# Patient Record
Sex: MALE | Race: WHITE | NOT HISPANIC OR LATINO | URBAN - METROPOLITAN AREA
[De-identification: names, ages, dates, MRNs, and addresses within clinical notes are randomized per-mention and may not be internally consistent; named-entity substitution may affect disease eponyms.]

---

## 2017-03-22 ENCOUNTER — OUTPATIENT (OUTPATIENT)
Dept: OUTPATIENT SERVICES | Facility: HOSPITAL | Age: 67
LOS: 1 days | End: 2017-03-22

## 2017-06-09 ENCOUNTER — OUTPATIENT (OUTPATIENT)
Dept: OUTPATIENT SERVICES | Facility: HOSPITAL | Age: 67
LOS: 1 days | End: 2017-06-09

## 2017-11-14 ENCOUNTER — OUTPATIENT (OUTPATIENT)
Dept: OUTPATIENT SERVICES | Facility: HOSPITAL | Age: 67
LOS: 1 days | End: 2017-11-14

## 2017-12-23 ENCOUNTER — EMERGENCY (EMERGENCY)
Facility: HOSPITAL | Age: 67
LOS: 1 days | End: 2017-12-23
Payer: MEDICARE

## 2017-12-23 PROCEDURE — 99285 EMERGENCY DEPT VISIT HI MDM: CPT

## 2017-12-23 PROCEDURE — 71010: CPT | Mod: 26

## 2017-12-23 PROCEDURE — 70450 CT HEAD/BRAIN W/O DYE: CPT | Mod: 26

## 2018-01-17 ENCOUNTER — OUTPATIENT (OUTPATIENT)
Dept: OUTPATIENT SERVICES | Facility: HOSPITAL | Age: 68
LOS: 1 days | End: 2018-01-17

## 2018-01-23 ENCOUNTER — OUTPATIENT (OUTPATIENT)
Dept: OUTPATIENT SERVICES | Facility: HOSPITAL | Age: 68
LOS: 1 days | End: 2018-01-23

## 2018-03-15 ENCOUNTER — OUTPATIENT (OUTPATIENT)
Dept: OUTPATIENT SERVICES | Facility: HOSPITAL | Age: 68
LOS: 1 days | End: 2018-03-15
Payer: MEDICARE

## 2018-03-15 PROCEDURE — 72110 X-RAY EXAM L-2 SPINE 4/>VWS: CPT | Mod: 26

## 2018-05-14 ENCOUNTER — OUTPATIENT (OUTPATIENT)
Dept: OUTPATIENT SERVICES | Facility: HOSPITAL | Age: 68
LOS: 1 days | End: 2018-05-14

## 2018-07-30 ENCOUNTER — OUTPATIENT (OUTPATIENT)
Dept: OUTPATIENT SERVICES | Facility: HOSPITAL | Age: 68
LOS: 1 days | End: 2018-07-30

## 2018-11-02 ENCOUNTER — OUTPATIENT (OUTPATIENT)
Dept: OUTPATIENT SERVICES | Facility: HOSPITAL | Age: 68
LOS: 1 days | End: 2018-11-02

## 2019-02-05 ENCOUNTER — OUTPATIENT (OUTPATIENT)
Dept: OUTPATIENT SERVICES | Facility: HOSPITAL | Age: 69
LOS: 1 days | End: 2019-02-05

## 2019-03-05 ENCOUNTER — OUTPATIENT (OUTPATIENT)
Dept: OUTPATIENT SERVICES | Facility: HOSPITAL | Age: 69
LOS: 1 days | End: 2019-03-05

## 2019-04-02 ENCOUNTER — OUTPATIENT (OUTPATIENT)
Dept: OUTPATIENT SERVICES | Facility: HOSPITAL | Age: 69
LOS: 1 days | End: 2019-04-02

## 2019-05-07 ENCOUNTER — OUTPATIENT (OUTPATIENT)
Dept: OUTPATIENT SERVICES | Facility: HOSPITAL | Age: 69
LOS: 1 days | End: 2019-05-07

## 2019-05-28 ENCOUNTER — TRANSCRIPTION ENCOUNTER (OUTPATIENT)
Age: 69
End: 2019-05-28

## 2019-06-20 ENCOUNTER — OUTPATIENT (OUTPATIENT)
Dept: OUTPATIENT SERVICES | Facility: HOSPITAL | Age: 69
LOS: 1 days | End: 2019-06-20

## 2019-09-05 ENCOUNTER — OUTPATIENT (OUTPATIENT)
Dept: OUTPATIENT SERVICES | Facility: HOSPITAL | Age: 69
LOS: 1 days | End: 2019-09-05

## 2019-12-12 ENCOUNTER — OUTPATIENT (OUTPATIENT)
Dept: OUTPATIENT SERVICES | Facility: HOSPITAL | Age: 69
LOS: 1 days | End: 2019-12-12

## 2020-01-16 ENCOUNTER — OUTPATIENT (OUTPATIENT)
Dept: OUTPATIENT SERVICES | Facility: HOSPITAL | Age: 70
LOS: 1 days | End: 2020-01-16

## 2020-02-14 ENCOUNTER — OUTPATIENT (OUTPATIENT)
Dept: OUTPATIENT SERVICES | Facility: HOSPITAL | Age: 70
LOS: 1 days | End: 2020-02-14

## 2020-03-07 ENCOUNTER — OUTPATIENT (OUTPATIENT)
Dept: OUTPATIENT SERVICES | Facility: HOSPITAL | Age: 70
LOS: 1 days | End: 2020-03-07

## 2020-05-22 ENCOUNTER — OUTPATIENT (OUTPATIENT)
Dept: OUTPATIENT SERVICES | Facility: HOSPITAL | Age: 70
LOS: 1 days | End: 2020-05-22

## 2020-09-10 ENCOUNTER — OUTPATIENT (OUTPATIENT)
Dept: OUTPATIENT SERVICES | Facility: HOSPITAL | Age: 70
LOS: 1 days | End: 2020-09-10

## 2020-09-15 ENCOUNTER — OUTPATIENT (OUTPATIENT)
Dept: OUTPATIENT SERVICES | Facility: HOSPITAL | Age: 70
LOS: 1 days | End: 2020-09-15

## 2020-09-19 ENCOUNTER — OUTPATIENT (OUTPATIENT)
Dept: OUTPATIENT SERVICES | Facility: HOSPITAL | Age: 70
LOS: 1 days | End: 2020-09-19

## 2020-10-05 ENCOUNTER — OUTPATIENT (OUTPATIENT)
Dept: OUTPATIENT SERVICES | Facility: HOSPITAL | Age: 70
LOS: 1 days | End: 2020-10-05

## 2021-01-13 ENCOUNTER — OUTPATIENT (OUTPATIENT)
Dept: OUTPATIENT SERVICES | Facility: HOSPITAL | Age: 71
LOS: 1 days | End: 2021-01-13

## 2021-01-21 ENCOUNTER — OUTPATIENT (OUTPATIENT)
Dept: OUTPATIENT SERVICES | Facility: HOSPITAL | Age: 71
LOS: 1 days | End: 2021-01-21

## 2021-01-21 PROBLEM — Z00.00 ENCOUNTER FOR PREVENTIVE HEALTH EXAMINATION: Status: ACTIVE | Noted: 2021-01-21

## 2021-01-22 ENCOUNTER — OUTPATIENT (OUTPATIENT)
Dept: OUTPATIENT SERVICES | Facility: HOSPITAL | Age: 71
LOS: 1 days | End: 2021-01-22

## 2021-02-09 ENCOUNTER — APPOINTMENT (OUTPATIENT)
Dept: MAMMOGRAPHY | Facility: CLINIC | Age: 71
End: 2021-02-09
Payer: MEDICARE

## 2021-02-09 ENCOUNTER — APPOINTMENT (OUTPATIENT)
Dept: ULTRASOUND IMAGING | Facility: CLINIC | Age: 71
End: 2021-02-09
Payer: MEDICARE

## 2021-02-09 PROCEDURE — G0279: CPT

## 2021-02-09 PROCEDURE — 77066 DX MAMMO INCL CAD BI: CPT

## 2021-02-09 PROCEDURE — 76641 ULTRASOUND BREAST COMPLETE: CPT | Mod: RT

## 2021-02-19 ENCOUNTER — APPOINTMENT (OUTPATIENT)
Dept: BREAST CENTER | Facility: CLINIC | Age: 71
End: 2021-02-19

## 2021-03-12 ENCOUNTER — OUTPATIENT (OUTPATIENT)
Dept: OUTPATIENT SERVICES | Facility: HOSPITAL | Age: 71
LOS: 1 days | End: 2021-03-12

## 2021-05-10 ENCOUNTER — OUTPATIENT (OUTPATIENT)
Dept: OUTPATIENT SERVICES | Facility: HOSPITAL | Age: 71
LOS: 1 days | End: 2021-05-10

## 2021-09-24 ENCOUNTER — OUTPATIENT (OUTPATIENT)
Dept: OUTPATIENT SERVICES | Facility: HOSPITAL | Age: 71
LOS: 1 days | End: 2021-09-24

## 2021-10-01 ENCOUNTER — OUTPATIENT (OUTPATIENT)
Dept: OUTPATIENT SERVICES | Facility: HOSPITAL | Age: 71
LOS: 1 days | End: 2021-10-01

## 2021-11-02 ENCOUNTER — OUTPATIENT (OUTPATIENT)
Dept: OUTPATIENT SERVICES | Facility: HOSPITAL | Age: 71
LOS: 1 days | End: 2021-11-02

## 2022-01-27 ENCOUNTER — OUTPATIENT (OUTPATIENT)
Dept: OUTPATIENT SERVICES | Facility: HOSPITAL | Age: 72
LOS: 1 days | End: 2022-01-27

## 2022-01-27 DIAGNOSIS — R05.9 COUGH, UNSPECIFIED: ICD-10-CM

## 2022-02-07 ENCOUNTER — OUTPATIENT (OUTPATIENT)
Dept: OUTPATIENT SERVICES | Facility: HOSPITAL | Age: 72
LOS: 1 days | End: 2022-02-07

## 2022-02-07 DIAGNOSIS — C61 MALIGNANT NEOPLASM OF PROSTATE: ICD-10-CM

## 2022-02-07 DIAGNOSIS — I10 ESSENTIAL (PRIMARY) HYPERTENSION: ICD-10-CM

## 2022-02-07 DIAGNOSIS — R73.02 IMPAIRED GLUCOSE TOLERANCE (ORAL): ICD-10-CM

## 2022-02-07 DIAGNOSIS — E78.2 MIXED HYPERLIPIDEMIA: ICD-10-CM

## 2022-02-07 DIAGNOSIS — M1A.9XX0 CHRONIC GOUT, UNSPECIFIED, WITHOUT TOPHUS (TOPHI): ICD-10-CM

## 2022-02-10 ENCOUNTER — OUTPATIENT (OUTPATIENT)
Dept: OUTPATIENT SERVICES | Facility: HOSPITAL | Age: 72
LOS: 1 days | End: 2022-02-10

## 2022-02-10 DIAGNOSIS — C61 MALIGNANT NEOPLASM OF PROSTATE: ICD-10-CM

## 2022-06-06 ENCOUNTER — OUTPATIENT (OUTPATIENT)
Dept: OUTPATIENT SERVICES | Facility: HOSPITAL | Age: 72
LOS: 1 days | End: 2022-06-06

## 2022-06-06 DIAGNOSIS — I25.10 ATHEROSCLEROTIC HEART DISEASE OF NATIVE CORONARY ARTERY WITHOUT ANGINA PECTORIS: ICD-10-CM

## 2022-06-06 DIAGNOSIS — E78.00 PURE HYPERCHOLESTEROLEMIA, UNSPECIFIED: ICD-10-CM

## 2022-06-06 DIAGNOSIS — G47.33 OBSTRUCTIVE SLEEP APNEA (ADULT) (PEDIATRIC): ICD-10-CM

## 2022-06-06 DIAGNOSIS — I10 ESSENTIAL (PRIMARY) HYPERTENSION: ICD-10-CM

## 2022-06-06 DIAGNOSIS — R73.01 IMPAIRED FASTING GLUCOSE: ICD-10-CM

## 2022-08-24 ENCOUNTER — OUTPATIENT (OUTPATIENT)
Dept: OUTPATIENT SERVICES | Facility: HOSPITAL | Age: 72
LOS: 1 days | End: 2022-08-24

## 2022-08-24 DIAGNOSIS — C61 MALIGNANT NEOPLASM OF PROSTATE: ICD-10-CM

## 2023-05-03 ENCOUNTER — NON-APPOINTMENT (OUTPATIENT)
Age: 73
End: 2023-05-03

## 2023-05-09 ENCOUNTER — NON-APPOINTMENT (OUTPATIENT)
Age: 73
End: 2023-05-09

## 2023-05-09 VITALS — BODY MASS INDEX: 31.1 KG/M2 | WEIGHT: 210 LBS | HEIGHT: 69 IN

## 2023-05-09 DIAGNOSIS — Z87.891 PERSONAL HISTORY OF NICOTINE DEPENDENCE: ICD-10-CM

## 2023-05-09 NOTE — HISTORY OF PRESENT ILLNESS
[Former] : Former [TextBox_13] : Referred by Dr. Patience Ruiz.\par \par Mr. GAXIOLA is a 72 year old male with a history of CAD s/p stent, HTN, HLD and prostate cancer.\par \par He was called to review eligibility for Low-Dose CT lung cancer screening.  Reviewed and confirmed that the patient meets screening eligibility criteria:\par \par 72 years old \par \par Smoking Status: Former smoker  \par \par Number of pack(s) per day: 1\par Number of years smoked: 50\par Number of pack years smokin\par \par Number of years since quitting smokin\par Quit year: \par \par No symptoms of lung cancer, including new cough, change in cough, hemoptysis, and unintentional weight loss.\par \par No personal history of lung cancer.  No lung cancer in a first degree relative.  No history of lung disease or occupational exposures. [YearQuit] : 2016 [PacksperDay] : 1 [N_Years] : 50 [PacksperYear] : 50

## 2023-05-15 ENCOUNTER — APPOINTMENT (OUTPATIENT)
Dept: CT IMAGING | Facility: CLINIC | Age: 73
End: 2023-05-15
Payer: MEDICARE

## 2023-05-15 PROCEDURE — 71271 CT THORAX LUNG CANCER SCR C-: CPT

## 2023-05-25 ENCOUNTER — TRANSCRIPTION ENCOUNTER (OUTPATIENT)
Age: 73
End: 2023-05-25

## 2023-10-11 ENCOUNTER — OFFICE (OUTPATIENT)
Dept: URBAN - METROPOLITAN AREA CLINIC 38 | Facility: CLINIC | Age: 73
Setting detail: OPHTHALMOLOGY
End: 2023-10-11
Payer: MEDICARE

## 2023-10-11 DIAGNOSIS — H01.001: ICD-10-CM

## 2023-10-11 DIAGNOSIS — H01.004: ICD-10-CM

## 2023-10-11 DIAGNOSIS — H01.002: ICD-10-CM

## 2023-10-11 DIAGNOSIS — H25.13: ICD-10-CM

## 2023-10-11 DIAGNOSIS — H01.005: ICD-10-CM

## 2023-10-11 DIAGNOSIS — H43.811: ICD-10-CM

## 2023-10-11 PROCEDURE — 92004 COMPRE OPH EXAM NEW PT 1/>: CPT

## 2023-10-11 ASSESSMENT — LID EXAM ASSESSMENTS
OD_BLEPHARITIS: RLL RUL 1+ 2+
OS_BLEPHARITIS: LLL LUL 1+ 2+

## 2023-10-11 ASSESSMENT — CONFRONTATIONAL VISUAL FIELD TEST (CVF)
OS_FINDINGS: FULL
OD_FINDINGS: FULL

## 2023-10-11 ASSESSMENT — TONOMETRY
OS_IOP_MMHG: 12
OD_IOP_MMHG: 10

## 2023-10-13 ASSESSMENT — REFRACTION_CURRENTRX
OS_OVR_VA: 20/
OS_CYLINDER: -0.50
OS_VPRISM_DIRECTION: SV
OD_OVR_VA: 20/
OD_SPHERE: +2.25
OD_SPHERE: +2.00
OD_VPRISM_DIRECTION: SV
OD_OVR_VA: 20/
OS_VPRISM_DIRECTION: SV
OD_VPRISM_DIRECTION: SV
OS_OVR_VA: 20/
OS_AXIS: 175
OS_SPHERE: +2.00
OS_SPHERE: +2.25
OD_CYLINDER: SPH

## 2023-10-13 ASSESSMENT — KERATOMETRY
OS_AXISANGLE_DEGREES: 084
OD_K2POWER_DIOPTERS: 44.00
OD_AXISANGLE_DEGREES: 070
OS_K1POWER_DIOPTERS: 41.25
METHOD_AUTO_MANUAL: AUTO
OS_K2POWER_DIOPTERS: 42.50
OD_K1POWER_DIOPTERS: 42.50

## 2023-10-13 ASSESSMENT — SPHEQUIV_DERIVED
OS_SPHEQUIV: -0.125
OS_SPHEQUIV: -0.125
OD_SPHEQUIV: 0.375
OD_SPHEQUIV: 0.875
OS_SPHEQUIV: 0.125
OD_SPHEQUIV: 0.875

## 2023-10-13 ASSESSMENT — REFRACTION_MANIFEST
OS_AXIS: 010
OS_ADD: +2.75
OD_CYLINDER: -0.75
OU_VA: 20/25
OS_SPHERE: +0.25
OD_AXIS: 115
OS_VA2: 20/20(J1+)
OD_AXIS: 115
OD_VA1: 20/20-2
OD_SPHERE: +1.25
OD_SPHERE: +1.25
OS_VA1: 20/20-2
OS_VA1: 20/20-2
OS_CYLINDER: -0.75
OD_ADD: +2.75
OD_VA2: 20/20(J1+)
OS_VA2: 20/20(J1+)
OU_VA: 20/25
OD_VA1: 20/20-2
OD_VA2: 20/20(J1+)
OD_ADD: +2.75
OD_CYLINDER: -0.75
OS_ADD: +2.75
OS_SPHERE: +0.25
OS_AXIS: 010
OS_CYLINDER: -0.75

## 2023-10-13 ASSESSMENT — REFRACTION_AUTOREFRACTION
OS_CYLINDER: -1.25
OD_CYLINDER: -1.25
OS_SPHERE: +0.75
OS_AXIS: 011
OD_SPHERE: +1.00
OD_AXIS: 114

## 2023-10-13 ASSESSMENT — AXIALLENGTH_DERIVED
OS_AL: 24.2556
OD_AL: 23.3454
OS_AL: 24.1531
OS_AL: 24.2556
OD_AL: 23.3454
OD_AL: 23.5378

## 2023-10-13 ASSESSMENT — VISUAL ACUITY
OD_BCVA: 20/30
OS_BCVA: 20/20-2

## 2024-07-25 ENCOUNTER — NON-APPOINTMENT (OUTPATIENT)
Age: 74
End: 2024-07-25

## 2024-07-25 VITALS — WEIGHT: 210 LBS | BODY MASS INDEX: 31.1 KG/M2 | HEIGHT: 69 IN

## 2024-07-25 DIAGNOSIS — Z87.891 PERSONAL HISTORY OF NICOTINE DEPENDENCE: ICD-10-CM

## 2024-07-25 NOTE — HISTORY OF PRESENT ILLNESS
[Former] : Former [TextBox_13] : Referred by Dr. Patience Ruiz.  Mr. GAXIOLA is a 74 year old male with a history of CAD s/p stent, HTN, HLD and prostate cancer.  Reviewed and confirmed that the patient meets screening eligibility criteria:  74 years old   Smoking Status: Former smoker    Number of pack(s) per day: 1 Number of years smoked: 50 Number of pack years smokin Quit year:   No symptoms of lung cancer, including new cough, change in cough, hemoptysis, and unintentional weight loss.  No personal history of lung cancer.  No lung cancer in a first degree relative.  No history of lung disease or occupational exposures. [YearQuit] : 2016 [PacksperDay] : 1 [N_Years] : 50 [PacksperYear] : 50

## 2024-08-12 ENCOUNTER — APPOINTMENT (OUTPATIENT)
Dept: CT IMAGING | Facility: CLINIC | Age: 74
End: 2024-08-12

## 2024-08-12 PROCEDURE — 71271 CT THORAX LUNG CANCER SCR C-: CPT

## 2024-11-08 ENCOUNTER — OFFICE (OUTPATIENT)
Dept: URBAN - METROPOLITAN AREA CLINIC 38 | Facility: CLINIC | Age: 74
Setting detail: OPHTHALMOLOGY
End: 2024-11-08
Payer: MEDICARE

## 2024-11-08 ENCOUNTER — RX ONLY (RX ONLY)
Age: 74
End: 2024-11-08

## 2024-11-08 DIAGNOSIS — H01.002: ICD-10-CM

## 2024-11-08 DIAGNOSIS — H25.13: ICD-10-CM

## 2024-11-08 DIAGNOSIS — H01.001: ICD-10-CM

## 2024-11-08 DIAGNOSIS — H01.004: ICD-10-CM

## 2024-11-08 DIAGNOSIS — H01.005: ICD-10-CM

## 2024-11-08 DIAGNOSIS — H43.811: ICD-10-CM

## 2024-11-08 PROCEDURE — 92014 COMPRE OPH EXAM EST PT 1/>: CPT

## 2024-11-08 ASSESSMENT — LID EXAM ASSESSMENTS
OS_BLEPHARITIS: LLL LUL 1+ 2+
OD_BLEPHARITIS: RLL RUL 1+ 2+

## 2024-11-08 ASSESSMENT — REFRACTION_MANIFEST
OD_ADD: +2.75
OS_ADD: +2.75
OS_VA1: 20/30+2
OD_VA2: 20/20(J1+)
OS_VA2: 20/20(J1+)
OD_VA2: 20/20(J1+)
OS_CYLINDER: -0.25
OD_ADD: +2.75
OU_VA: 20/25
OS_AXIS: 010
OS_SPHERE: +0.25
OD_VA1: 20/20-2
OD_SPHERE: +0.50
OS_SPHERE: -0.25
OS_AXIS: 030
OS_VA1: 20/20-2
OU_VA: 20/20-
OS_ADD: +2.75
OD_SPHERE: +1.25
OD_CYLINDER: -0.75
OD_AXIS: 115
OS_VA2: 20/20(J1+)
OD_VA1: 20/20-
OS_CYLINDER: -0.75
OD_CYLINDER: -0.50
OD_AXIS: 130

## 2024-11-08 ASSESSMENT — REFRACTION_CURRENTRX
OS_CYLINDER: -0.50
OD_OVR_VA: 20/
OS_SPHERE: +2.00
OS_VPRISM_DIRECTION: SV
OD_CYLINDER: SPH
OD_VPRISM_DIRECTION: SV
OS_OVR_VA: 20/
OS_AXIS: 179
OD_VPRISM_DIRECTION: SV
OD_SPHERE: +2.25
OD_SPHERE: +2.00
OS_VPRISM_DIRECTION: SV
OS_OVR_VA: 20/
OD_OVR_VA: 20/
OS_SPHERE: +2.25

## 2024-11-08 ASSESSMENT — CONFRONTATIONAL VISUAL FIELD TEST (CVF)
OD_FINDINGS: FULL
OS_FINDINGS: FULL

## 2024-11-08 ASSESSMENT — VISUAL ACUITY
OS_BCVA: 20/25-
OD_BCVA: 20/40

## 2024-11-08 ASSESSMENT — REFRACTION_AUTOREFRACTION
OD_CYLINDER: -0.50
OD_SPHERE: +0.50
OS_CYLINDER: -0.25
OD_AXIS: 130
OS_SPHERE: -0.25
OS_AXIS: 029

## 2024-11-08 ASSESSMENT — KERATOMETRY
OS_K1POWER_DIOPTERS: 41.75
OD_K2POWER_DIOPTERS: 43.00
OS_AXISANGLE_DEGREES: 081
OD_K1POWER_DIOPTERS: 42.00
METHOD_AUTO_MANUAL: AUTO
OS_K2POWER_DIOPTERS: 42.00
OD_AXISANGLE_DEGREES: 064